# Patient Record
Sex: MALE | Race: BLACK OR AFRICAN AMERICAN | NOT HISPANIC OR LATINO | Employment: STUDENT | URBAN - METROPOLITAN AREA
[De-identification: names, ages, dates, MRNs, and addresses within clinical notes are randomized per-mention and may not be internally consistent; named-entity substitution may affect disease eponyms.]

---
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
## 2025-02-13 ENCOUNTER — TELEPHONE (OUTPATIENT)
Dept: GENETICS | Facility: CLINIC | Age: 25
End: 2025-02-13
Payer: MEDICAID

## 2025-03-24 ENCOUNTER — TELEPHONE (OUTPATIENT)
Dept: GENETICS | Facility: CLINIC | Age: 25
End: 2025-03-24
Payer: MEDICAID

## 2025-04-02 ENCOUNTER — TELEPHONE (OUTPATIENT)
Dept: GENETICS | Facility: CLINIC | Age: 25
End: 2025-04-02
Payer: MEDICAID

## 2025-04-03 ENCOUNTER — OFFICE VISIT (OUTPATIENT)
Dept: GENETICS | Facility: CLINIC | Age: 25
End: 2025-04-03
Payer: MEDICAID

## 2025-04-03 VITALS
DIASTOLIC BLOOD PRESSURE: 89 MMHG | WEIGHT: 299.38 LBS | HEIGHT: 74 IN | BODY MASS INDEX: 38.42 KG/M2 | HEART RATE: 76 BPM | SYSTOLIC BLOOD PRESSURE: 138 MMHG

## 2025-04-03 DIAGNOSIS — Q85.89 STURGE-WEBER SYNDROME: Primary | ICD-10-CM

## 2025-04-03 DIAGNOSIS — F84.0 AUTISM: ICD-10-CM

## 2025-04-03 PROCEDURE — 99213 OFFICE O/P EST LOW 20 MIN: CPT | Mod: PBBFAC | Performed by: MEDICAL GENETICS

## 2025-04-03 PROCEDURE — 99999 PR PBB SHADOW E&M-EST. PATIENT-LVL III: CPT | Mod: PBBFAC,,, | Performed by: MEDICAL GENETICS

## 2025-04-03 NOTE — PROGRESS NOTES
[facility] Evaluation - New Patient       Genetics History:  Jian Shane is a 24 y.o. old male who is sent for consultation at the request of Eddie Blackwell MD for genetics regarding the diagnosis, management, and genetic counseling for the findings of seizures, autism, ADHD and Sturge Dominguez Syndrome . This patient was seen in [facility] by physician Dr. Irina Andrade and Genetic counselor Delma Correa.  The patient was accompanied to clinic by mother.      Jian Shane  is a 24 y.o. male with seizures, autism, ADHD, and Sturge Dominguez syndrome. Jian was diagnosed with autism and ADHD at 7yo. Jian was in SpEd classes and graduated high school. He had an IEP for a classroom aid and to receive speech therapy. Jian had his first seizure in 2024. He had a bad headache and then had the seizure. He was admitted to the hospital and incidentally found to have diabetic acidosis and was diagnosed with diabetes. He describes being unable to speak or feel his arms while he was recovering from his seizure.      Jian has myopia and astigmatism. He has a follow up appointment for ophthalmology regarding concern for Sturge dominguez syndrome in April.      Jian had his gall bladder removed.     Review of systems:   A complete review of systems was negative other than as stated above.     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 No pregnancy complications. Jian went home with his mother as expected.       Past Medical History:  Past Medical History:   Diagnosis Date    ADHD (attention deficit hyperactivity disorder)     Autism         Past Surgical History:  No past surgical history on file.    Development History:  Mom  reports no concerns for developmental milestones. Jian was evaluated and diagnosed with autism at 5yo. He was in special education throughout school. Mom reports he was in a high functioning classroom. He had an IEP where he got speech therapy and had an aid in his classroom. Jian cannot read, but has very good comprehension of audio input.       Family History: pedigree in chart  Jian has two full sisters, on 34yo who is healthy and one 26yo who has well controlled seizures onset at 13yo, ADHD, and a mood disorder. Jian also has a full brother who is 32yo. Jian's mother is 54yo and has diabetes, HTN and anemia. Jian has five maternal uncles, one who has schizophrenia and one who passed away from diabetes complications. Maternal cousins are healthy. MGM passed away at 100 yo. Jian's first cousins once removed has autism. MGF passed away, limited info. Jian's father is 56yo and has diabetes. Jian has three full paternal uncles, two paternal half aunts and several paternal cousins all whom are healthy. PGF passed away, limited information. PGM passed away at 955yo    3-generation family history obtained and otherwise negative for history of intellectual disability/developmental delay, birth defects, or 3 or more miscarriages unless otherwise noted above. Consanguinity denied.     Social History:  The family resides in [place], [place].    Allergies:  Review of patient's allergies indicates:  No Known Allergies      Medications:  Current Medications[1]      Physical exam:           Measurements:  - Weight: 135.8 kg  - Height: 1.89 m    Examination:  General:  Alert and oriented, No acute distress.    Appearance:obese  Behavior: Within normal limits.    Skin: Normal for ethnicity, no significant birthmarks or pigmentary changes; hair is normal in texture and distribution       Craniofacial exam:         - Normal head shape; sloping forehead        - Normal hair pattern, distribution and texture         - Eyes  are symmetric; mildly widely spaced; with normal shape; eyelashes are normal        - Ears: normal in appearance and posteriorly rotated        - Nose: normal appearance, with normal philtrum        - Mouth: normal shape; normal lips; intact palate with normal shape; teeth are normal in appearance    Neck:  Supple  Chest:  Normal appearance, no pectus. Nipples are normal in appearance and placement.    Cardiovascular:  RRR   Respiratory:  Respirations are non-labored.    Abdomen: Soft  Genitalia:  exam deferred  Musculoskeletal: No deformity.    Mobility/gait: within normal limits  Upper extremity exam: normal - digits appear normal with normal palmar and digital creases and fingernails.   Lower extremity exam: normal, toes appear normal with normal toe nails.   Neurologic: No focal deficits noted    Diagnostic Studies Reviewed:  Brain MRI 2/11/25  Leptomeningeal enhancement and signal void which FOCALLY involves the dorsal left temporal lobe. This corresponds to gyriform calcifications demonstrated on prior exams. There is no associated left temporal atrophy or parenchymal edema. This can be seen with Sturge-Acevedo syndrome although other entities such as localized meningioangiomatosis/ meningeal vascular malformation or other vascular malformation could be considered.    CTA head and neck 7/2024:  1.   No finding of stenosis or occlusion of the cervical or intracranial vasculature. No intracranial aneurysm evident.   2.   Serpiginous tram track calcifications along the posterior left temporal lobe cortex with mild leptomeningeal enhancement, which can be seen with Sturge-Acevedo syndrome and other congenital anomalies.   3.   Pansinusitis with reactive cervical lymphadenopathy.     MRI brain 7/2024:  Subtle restricted diffusion associated with the posterior parietal lobe at the location of previously identified tram track calcifications on same day CT. This area demonstrates leptomeningeal enhancement. There is  signal dropout on gradient echo imaging due to calcifications seen on CT. No other areas of restricted diffusion or enhancement are seen. 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     Assessment:  Jian is a 24 y.o. old male who was evaluated today in genetics clinic due to seizures, autism, ADHD and Sturge Acevedo Syndrome.    Jian was diagnosed with autism and ADHD at 7yo. Jian was in SpEd classes and had an IEP in school. He had his first seizure in July 2024 when he was diagnosed with DKA. Brain imaging then revealed features concerning for possible Sturge Acevedo. Jian has myopia and astigmatism and follows with Ophthalmology. Jian previously had genetic testing ([facility] mtDNA/ nuclear gene panel) which identified two variants. One heterozygous likely pathogenic variant in ACO2 and one heterozygous likely pathogenic variant in GFM1.      Physical exam revealed features as noted above.    Discussion:  Sturge Acevedo syndrome:  Sturge-Acevedo syndrome (SWS) is a rare, sporadic neurocutaneous disorder characterized by a facial port-wine stain, leptomeningeal vascular malformations, and ocular abnormalities such as glaucoma. The hallmark facial capillary malformation, or port-wine birthmark, typically involves the ophthalmic branch of the trigeminal nerve. Neurological manifestations include seizures, stroke-like episodes, hemiparesis, headaches, and cognitive impairments.  The pathogenesis of SWS is primarily linked to somatic mosaic mutations in the GNAQ gene, with the R183Q mutation being the most common. This mutation leads to abnormal vasculature in the brain, skin, and eyes. Less commonly, mutations in GNA11 and GNB2 have also been implicated.  Diagnosis is often confirmed through contrast-enhanced magnetic resonance imaging (MRI), which reveals leptomeningeal angiomas, brain atrophy, and calcifications.Early diagnosis and multidisciplinary management are crucial for optimizing patient  outcomes.   Although rare, it is possible for a patient to have SWS without a facial port-wine birthmark.Approximately 92% of patients with Sturge-Acevedo syndrome have a port-wine stain. This statistic is supported by a large patient registry study, which found that the majority of individuals with clinically diagnosed Sturge-Acevedo syndrome presented with this characteristic facial capillary malformation.  The relationship between autism and Sturge-Acevedo syndrome (SWS) has been increasingly recognized in recent years. Studies have shown that approximately 24-40% of individuals with SWS have a clinical diagnosis of autism spectrum disorder (ASD). 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 AR conditions require two pathogenic variants to cause disease while AD conditions only require one. Jian does not have optic atrophy or retinal degeneration and several other labs (ClinVar) have called this variant benign or likely benign. It is unlikely to cause symptoms for Jian.     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 Jian has one likely pathogenic variant in this gene and it requires two variants for someone to have features of this condition. At most, Jian is a carrier for combined oxidative phosphorylation deficiency 1. If he chooses to have biological children, his future partner can consider carrier screening for GFM1 to better understand the risk for an affected child.     Since the patient is diagnosed with Sturge Acevedo by his Neurology team, which is caused by somatic mutation, no testing  is recommended. Counseling regarding his previous genetic panel was also completed during the visit. All questions/concerns were addressed.     Plan:  - Follow up as needed or if new concerns arise        References:   Updates on Sturge-Acevedo Syndrome.Yeom SHawa AM.  Stroke. 2022;53(12):3494-6730. doi:10.1161/STROKEAHA.122.789613.    Sturge-Acevedo Syndrome: An Overview of History, Genetics, Clinical Manifestations, and Management.Pascual Villegas.  Seminars in Pediatric Neurology. 2024;51:923757. doi:10.1016/j.spen.2024.578115.   New Research    Multidisciplinary, Multicenter Consensus for the Care of Patients Affected With Sturge-Acevedo Syndrome.El Vivien M, Montana A, Angel A, et al.  Orphanet Journal of Rare Diseases. 2025;20(1):28. doi:10.1186/s05860-260-63239-z.   New Research    Sturge-Acevedo Syndrome and Port-Wine Stains Caused by Somatic Mutation in GNAQ.Sophia ENGLISH, Adam H, Hope CJ, et al.  The [place] Journal of Medicine. 2013;368(21):1971-9. doi:10.1056/UWUNew3019093.    A Novel Somatic Mutation in GNB2 Provides New Insights to the Pathogenesis of Sturge-Acevedo Syndrome.Femi R, Lenore K, Ryan O, et al.  Human Molecular Genetics. 2021;30(21):9467-9382. doi:10.1093/[facility]/elos728.    Sturge-Acevedo Syndrome: Updates in Translational Neurology.Hawa Reddy.  Frontiers in Neurology. 2024;15:3704674. doi:10.3389/fneur.2024.2902650.    Sturge-Acevedo Syndrome Patient Registry: Delayed Diagnosis and Poor Seizure Control.Chinedu S, Jena B, Alcides KL, Casimiro JA, Supa J.  The Journal of Pediatrics. 2019;215:158-163.e6. doi:10.1016/j.jpeds.2019.08.025.    The Prevalence and Profile of Autism in Sturge-Acevedo Syndrome.Alondra RINALDI, Zachary J, Tom S, et al.  Journal of Autism and Developmental Disorders. 2022;52(5):6581-8658. doi:10.1007/k77993-137-89322-6.    Face to Face time with patient: 28 minutes  71 minutes of total time spent on the encounter, which includes face to face time and non-face to  face time preparing to see the patient (eg, review of tests), Obtaining and/or reviewing separately obtained history, Documenting clinical information in the electronic or other health record, Independently interpreting results (not separately reported) and communicating results to the patient/family/caregiver, or Care coordination (not separately reported).          Irina Andrade MD  Medical Genetics  [facility]    Delma Correa [facility]  Genetic Counselor   [facility]       It was a pleasure to see Jian today.  We can see him back in Genetics clinic as needed.. Should any questions or concerns arise following today's visit, we encourage the patient to contact the Genetics Office.          [1]   Current Outpatient Medications:     guanfacine (TENEX) 2 MG tablet, TK 1  T PO  AT BEDTIME (Patient not taking: Reported on 4/3/2025), Disp: , Rfl: 3    VYVANSE 50 mg capsule, TK 1 C PO QAM (Patient not taking: Reported on 4/3/2025), Disp: , Rfl: 0

## 2025-04-03 NOTE — PROGRESS NOTES
Face to Face time with patient: 27 minutes  75 minutes of total time spent on the encounter on the day of visit, which includes face to face time and non-face to face time preparing to see the patient (eg, review of tests), Obtaining and/or reviewing separately obtained history, Documenting clinical information in the electronic or other health record, Independently interpreting results (not separately reported) and communicating results to the patient/family/caregiver, or Care coordination (not separately reported).     GENETIC COUNSELING NOTE    [facility]  [address] [place] [address]    DATE OF CONSULTATION: 04/03/2025    REFERRING PHYSICIAN: Eddie Blackwell MD    REASON FOR CONSULTATION: We are requested by Dr. 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 Jian Shane  is accompanied to clinic today by his mother.      HISTORY OF PRESENT ILLNESS:  Jian Shane  is a 24 y.o. male with seizures, autism, ADHD, and possible Sturge Dominguez syndrome. Jian was diagnosed with autism and ADHD at 5yo. Jian was in SpEd classes and graduated high school. He had an IEP for a classroom aid and to receive speech therapy. Jian had his first seizure in July 2024. He had a bad headache and then had the seizure. He was admitted to the hospital and incidentally found to have diabetic acidosis and was diagnosed with diabetes. He describes being unable to speak or feel his arms while he was recovering from his seizure.     Jian has myopia and astigmatism. He has a follow up appointment for ophthalmology regarding concern for Sturge dominguez syndrome in April.     Jian had his gall bladder removed.       REVIEW OF SYSTEMS: A complete review of systems was negative other than as stated above.      MEDICAL HISTORY:    Gestational/Birth History: Jian  was born full term to a 30yo  mother and a 34yo father. No pregnancy complications. Jian went home with his mother as expected.     Past Medical History:  Patient Active Problem List    Diagnosis Date Noted    Acanthosis nigricans 2017    Abnormal weight gain 2017       Past Surgical History:  No past surgical history on file.    Developmental History:  Mom reports no concerns for developmental milestones. Jian was evaluated and diagnosed with autism at 7yo. He was in special education throughout school. Mom reports he was in a high functioning classroom. He had an IEP where he got speech therapy and had an aid in his classroom. Jian cannot read, but has very good comprehension of audio input.       Family History:  Jian has two full sisters, on 34yo who is healthy and one 24yo who has well controlled seizures onset at 13yo, ADHD, and a mood disorder. Jian also has a full brother who is 30yo. Jian's mother is 56yo and has diabetes, HTN and anemia. Jian has five maternal uncles, one who has schizophrenia and one who passed away from diabetes complications. Maternal cousins are healthy. MGM passed away at 100 yo. Jian's first cousins once removed has autism. MGF passed away, limited info. Jian's father is 56yo and has diabetes. Jian has three full paternal uncles, two paternal half aunts and several paternal cousins all whom are healthy. PGF passed away, limited information. PGM passed away at 955yo    3-generation family history obtained and otherwise negative for history of intellectual disability/developmental delay, birth defects, or 3 or more miscarriages unless otherwise noted above. Consanguinity denied.    Social History:  The family resides in [place], [place].      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 This corresponds to gyriform calcifications demonstrated on prior exams. There is no associated left temporal atrophy or parenchymal edema. This can be seen with Sturge-Acevedo syndrome although other entities such as localized meningioangiomatosis/ meningeal vascular malformation or other vascular malformation could be considered.    FINDINGS:   There is no abnormal restricted diffusion or evidence suggest acute or early subacute infarction. No in the left parietal lobe corresponding evolving changes discussed on the 7/29/2024 MRI   Redemonstration of leptomeningeal enhancement and abnormal susceptibility artifact overlying the left posterior temporal lobe (image 12 series 901; images 14-21 series 801). There is no associated edema or volume loss of the subjacent left temporal lobe. There is trace extra-axial intrinsic T1 signal overlying the lateral left tentorium. The collateral medullary veins do not appear particularly asymmetrically prominent on SWI. Parenchymal and ventricular volumes are normal. No mass effect or midline shift. Basal cisterns remain patent. Vascular flow voids are preserved. No evidence of abnormal intraparenchymal enhancement.   No abnormal calvarial signal to suggest a destructive osseous lesion. No definite signal changes of the overlying left temporal marrow. Bilateral maxillary retention cysts are present.     ASSESSMENT/DISCUSSION:    Jian Shane  is a 24 y.o. 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 Jian previously had genetic testing ([facility] mtDNA/ nuclear gene panel) which identified two variants. One heterozygous likely pathogenic variant in ACO2 and one heterozygous likely pathogenic variant in GFM1.     ACO2  is associated with autosomal recessive (AR) infantile cerebellar-retinal degeneration and AR and autosomal dominant (AD) optic atrophy 9. AR conditions require two pathogenic variants to cause disease while AD conditions only require one. Jian does not have optic atrophy or retinal degeneration and several other labs (ClinVar) have called this variant benign or likely benign. It is unlikely to cause symptoms for Jian.     GFM1 is associated with AR combined oxidative phosphorylation deficiency 1. Jian has one likely pathogenic variant in this gene and it requires two variants for someone to have features of this condition. At most, Jian is a carrier for combined oxidative phosphorylation deficiency 1. If he chooses to have biological children, his future partner can consider carrier screening for GFM1 to better understand the risk for an affected child.     Sturge-Acevedo syndrome is caused by abnormal blood vessel growth. It is a mosaic somatic genetic condition, which means it is not inherited. Some cells will be affected with Sturge-Acevedo syndrome and some will not. That makes confirmatory genetic testing challenging for Sturge-Acevedo. Molecular diagnosis requires testing on affected tissue (brain, skin etc.). Typically Sturge- Acevedo is diagnosed based on clinical findings. The three main features are port wine birthmark (collection of blood vessels), glaucoma, and leptomeningeal angiomas which form in the brain stem and spine. Sturge- Acevedo can also cause seizures, developmental delay, autism, hypothyroidism, intellectual disability, and headaches.     It was a pleasure to see Jian Shane  today. Should any questions or concerns arise following today's visit, we encourage the family to contact the Genetics Office. Please see Dr. Andrade 's note for physical exam information, medical management, additional counseling, and decisions regarding genetic testing     RECOMMENDATIONS/PLAN:  Please see Dr. Andrade 's note  for recommendations     CITATIONS:   FAROOQ Espino, NELLY Ferguson, LAZARO Yusuf, UNRULY Conklin, MERCEDES Gonzalez, & Yunier, FAROOQ CARO. (2018). Autism spectrum disorder, social communication difficulties, and developmental comorbidities in Sturge-Acevedo syndrome. Epilepsy & Behavior, 88, 1-4. [email or web address]/j.[email or web address]   Sturge-Acevedo Syndrome: What Is It, Causes & Symptoms. (n.d.). [facility]. [email or web address]     Delma Correa [facility]  Genetic Counselor   [facility]       EXTERNAL CC:    Breanna Harrington, Eddie Mulligan MD